# Patient Record
Sex: FEMALE | Race: WHITE | NOT HISPANIC OR LATINO | Employment: STUDENT | URBAN - METROPOLITAN AREA
[De-identification: names, ages, dates, MRNs, and addresses within clinical notes are randomized per-mention and may not be internally consistent; named-entity substitution may affect disease eponyms.]

---

## 2020-07-02 RX ORDER — DROSPIRENONE AND ETHINYL ESTRADIOL 0.02-3(28)
1 KIT ORAL DAILY
COMMUNITY
End: 2022-07-19 | Stop reason: SDUPTHER

## 2020-07-14 ENCOUNTER — OFFICE VISIT (OUTPATIENT)
Dept: OBSTETRICS AND GYNECOLOGY | Facility: CLINIC | Age: 20
End: 2020-07-14
Payer: COMMERCIAL

## 2020-07-14 VITALS
HEART RATE: 82 BPM | BODY MASS INDEX: 21.97 KG/M2 | HEIGHT: 67 IN | SYSTOLIC BLOOD PRESSURE: 104 MMHG | WEIGHT: 140 LBS | DIASTOLIC BLOOD PRESSURE: 71 MMHG

## 2020-07-14 DIAGNOSIS — Z11.3 SCREENING FOR STDS (SEXUALLY TRANSMITTED DISEASES): ICD-10-CM

## 2020-07-14 DIAGNOSIS — Z71.85 HPV VACCINE COUNSELING: ICD-10-CM

## 2020-07-14 DIAGNOSIS — Z01.419 ENCOUNTER FOR WELL WOMAN EXAM WITH ROUTINE GYNECOLOGICAL EXAM: Primary | ICD-10-CM

## 2020-07-14 PROCEDURE — 99395 PREV VISIT EST AGE 18-39: CPT | Mod: S$GLB,,, | Performed by: OBSTETRICS & GYNECOLOGY

## 2020-07-14 PROCEDURE — 99395 PR PREVENTIVE VISIT,EST,18-39: ICD-10-PCS | Mod: S$GLB,,, | Performed by: OBSTETRICS & GYNECOLOGY

## 2020-07-14 NOTE — PROGRESS NOTES
CC: Well Woman (age 21-25)    HPI:  Patient is a 20 y.o. year old G0  who presents for her well woman exam today.  History reviewed with patient and changes noted. Patient is without complaints or concerns today.        Past Medical History:   Diagnosis Date    Acne     Fibrocystic breast        History reviewed. No pertinent surgical history.    Social History     Tobacco Use    Smoking status: Never Smoker    Smokeless tobacco: Never Used   Substance Use Topics    Alcohol use: Never     Frequency: Never    Drug use: Never       Family History   Problem Relation Age of Onset    Breast cancer Maternal Grandmother         MGGM    Breast cancer Paternal Grandmother         PGGM    Breast cancer Maternal Aunt        Review of patient's allergies indicates:  No Known Allergies      Current Outpatient Medications:     drospirenone-ethinyl estradioL (NAYA) 3-0.02 mg per tablet, Take 1 tablet by mouth once daily., Disp: , Rfl:     hpv vaccine,9-fariba (GARDASIL 9, PF,) 0.5 mL Syrg, Inject 0.5 mLs into the muscle once. Repeat dose in 2 months and then again 4 months later to complete series for 1 dose, Disp: 0.5 mL, Rfl: 2    OB History        0    Para   0    Term   0       0    AB   0    Living   0       SAB   0    TAB   0    Ectopic   0    Multiple   0    Live Births   0                  GYN HX:    HX ABNL PAPS:  no    HX OF STDS:  none    GARDASIL: no but wants rx     CONTRACEPTION: ocps    HEALTH MAINTENANCE:  (PCP-Rafaela Chacon MD)    LAST ANNUAL / PAP:   2019  PAPresult: neg    LAST CXS:  2019  none     ROS:  Review of Systems   Constitutional: Negative for activity change, appetite change, chills, fatigue, fever and unexpected weight change.   Respiratory: Negative for cough and shortness of breath.    Cardiovascular: Negative for chest pain and leg swelling.   Gastrointestinal: Negative for abdominal pain, bloating, blood in stool, constipation, diarrhea, nausea and  "vomiting.   Endocrine: Negative for hair loss and hot flashes.   Genitourinary: Negative for decreased libido, dysmenorrhea, dyspareunia, dysuria, flank pain, frequency, genital sores, hematuria, menorrhagia, pelvic pain, urgency, vaginal bleeding, vaginal discharge, vaginal pain, urinary incontinence, postcoital bleeding and vaginal odor.   Musculoskeletal: Negative for arthralgias and joint swelling.   Integumentary:  Negative for rash, acne, mole/lesion, breast mass, nipple discharge, breast skin changes and breast tenderness.   Neurological: Negative for headaches.   Hematological: Does not bruise/bleed easily.   Psychiatric/Behavioral: Negative for depression and sleep disturbance. The patient is not nervous/anxious.    Breast: Negative for asymmetry, lump, mass, nipple discharge, skin changes and tenderness      VITALS:  Patient's last menstrual period was 06/18/2020 (exact date).  Vitals:    07/14/20 1123   BP: 104/71   Pulse: 82   Weight: 63.5 kg (140 lb)   Height: 5' 7" (1.702 m)     Body mass index is 21.93 kg/m².     PHYSICAL EXAM:  Physical Exam:   Constitutional: She is oriented to person, place, and time. She appears well-developed and well-nourished. No distress.    HENT:   Head: Normocephalic and atraumatic.     Neck: No thyroid mass present.    Cardiovascular: Normal rate and normal pulses.     Pulmonary/Chest: Effort normal. No respiratory distress. She exhibits no deformity. Right breast exhibits no inverted nipple, no mass, no nipple discharge, no skin change, no tenderness, no bleeding and no swelling. Left breast exhibits no inverted nipple, no mass, no nipple discharge, no skin change, no tenderness, no bleeding and no swelling. Breasts are symmetrical.        Abdominal: Soft. Normal appearance. She exhibits no distension. There is no abdominal tenderness.     Genitourinary:    Vagina and uterus normal.      Pelvic exam was performed with patient supine.   There is no rash, tenderness, lesion " or injury on the right labia. There is no rash, tenderness, lesion or injury on the left labia. Cervix is normal. Right adnexum displays no mass, no tenderness and no fullness. Left adnexum displays no mass, no tenderness and no fullness. No rectocele, cystocele or unspecified prolapse of vaginal walls in the vagina. Labial bartholins normal.negative for vaginal discharge          Musculoskeletal: Moves all extremeties.       Neurological: She is alert and oriented to person, place, and time.    Skin: Skin is warm and dry. No lesion and no rash noted.    Psychiatric: She has a normal mood and affect. Her speech is normal and behavior is normal. Judgment and thought content normal.     *female chaperone present for exam    ASSESSMENT and PLAN:  Encounter for well woman exam with routine gynecological exam  -     Liquid-based pap smear, screening    Screening for STDs (sexually transmitted diseases)    HPV vaccine counseling  -     hpv vaccine,9-fariba (GARDASIL 9, PF,) 0.5 mL Syrg; Inject 0.5 mLs into the muscle once. Repeat dose in 2 months and then again 4 months later to complete series for 1 dose  Dispense: 0.5 mL; Refill: 2         FOLLOWUP:  Follow up in about 1 year (around 7/14/2021) for wwe.     COUNSELING:  Patient was counseled today on A.C.S. Pap guidelines and recommendations for yearly pelvic exam, monthly self breast exams, contraception.  Encouraged patient to see her PCP for other health maintenance.

## 2020-07-22 ENCOUNTER — TELEPHONE (OUTPATIENT)
Dept: OBSTETRICS AND GYNECOLOGY | Facility: CLINIC | Age: 20
End: 2020-07-22

## 2020-07-22 NOTE — TELEPHONE ENCOUNTER
----- Message from Janae Muir sent at 7/22/2020  3:26 PM CDT -----  .Type:  Test Results    Who Called:  Patient   Name of Test (Lab/Mammo/Etc):    Date of Test:   Ordering Provider: dr. Andrade   Where the test was performed:  [facility]   Would the patient rather a call back or a response via [facility]?  Call   Best Call Back Number:  [phone]  Additional Information:  n/a

## 2020-07-22 NOTE — TELEPHONE ENCOUNTER
Spoke with patient. Two pt identifiers confirmed. Notified patient of results of normal pap, but cultures still pending.. Patient verbalized understanding.

## 2020-07-27 LAB
CHLAMYDIA: NOT DETECTED
GONORRHEA: NOT DETECTED

## 2020-07-28 ENCOUNTER — TELEPHONE (OUTPATIENT)
Dept: OBSTETRICS AND GYNECOLOGY | Facility: CLINIC | Age: 20
End: 2020-07-28

## 2021-07-01 ENCOUNTER — PATIENT MESSAGE (OUTPATIENT)
Dept: ADMINISTRATIVE | Facility: OTHER | Age: 21
End: 2021-07-01

## 2021-07-15 ENCOUNTER — OFFICE VISIT (OUTPATIENT)
Dept: OBSTETRICS AND GYNECOLOGY | Facility: CLINIC | Age: 21
End: 2021-07-15
Payer: COMMERCIAL

## 2021-07-15 VITALS
HEART RATE: 68 BPM | WEIGHT: 153 LBS | SYSTOLIC BLOOD PRESSURE: 123 MMHG | HEIGHT: 67 IN | DIASTOLIC BLOOD PRESSURE: 81 MMHG | BODY MASS INDEX: 24.01 KG/M2

## 2021-07-15 DIAGNOSIS — Z01.419 ENCOUNTER FOR WELL WOMAN EXAM WITH ROUTINE GYNECOLOGICAL EXAM: Primary | ICD-10-CM

## 2021-07-15 DIAGNOSIS — Z11.3 SCREENING FOR STDS (SEXUALLY TRANSMITTED DISEASES): ICD-10-CM

## 2021-07-15 PROCEDURE — 3008F BODY MASS INDEX DOCD: CPT | Mod: CPTII,S$GLB,, | Performed by: OBSTETRICS & GYNECOLOGY

## 2021-07-15 PROCEDURE — 99395 PR PREVENTIVE VISIT,EST,18-39: ICD-10-PCS | Mod: S$GLB,,, | Performed by: OBSTETRICS & GYNECOLOGY

## 2021-07-15 PROCEDURE — 1126F PR PAIN SEVERITY QUANTIFIED, NO PAIN PRESENT: ICD-10-PCS | Mod: S$GLB,,, | Performed by: OBSTETRICS & GYNECOLOGY

## 2021-07-15 PROCEDURE — 1126F AMNT PAIN NOTED NONE PRSNT: CPT | Mod: S$GLB,,, | Performed by: OBSTETRICS & GYNECOLOGY

## 2021-07-15 PROCEDURE — 99395 PREV VISIT EST AGE 18-39: CPT | Mod: S$GLB,,, | Performed by: OBSTETRICS & GYNECOLOGY

## 2021-07-15 PROCEDURE — 3008F PR BODY MASS INDEX (BMI) DOCUMENTED: ICD-10-PCS | Mod: CPTII,S$GLB,, | Performed by: OBSTETRICS & GYNECOLOGY

## 2021-07-20 LAB
CHLAMYDIA: NEGATIVE
GONORRHEA: NEGATIVE
SOURCE: NORMAL
SOURCE: NORMAL
TRICHOMONAS AMPLIFIED: NEGATIVE

## 2021-09-07 ENCOUNTER — TELEPHONE (OUTPATIENT)
Dept: OBSTETRICS AND GYNECOLOGY | Facility: CLINIC | Age: 21
End: 2021-09-07

## 2022-02-21 ENCOUNTER — TELEPHONE (OUTPATIENT)
Dept: OBSTETRICS AND GYNECOLOGY | Facility: CLINIC | Age: 22
End: 2022-02-21
Payer: COMMERCIAL

## 2022-02-22 ENCOUNTER — TELEPHONE (OUTPATIENT)
Dept: OBSTETRICS AND GYNECOLOGY | Facility: CLINIC | Age: 22
End: 2022-02-22
Payer: COMMERCIAL

## 2022-02-22 NOTE — TELEPHONE ENCOUNTER
Attempted to contact patient, no answer. Left patient voice mail with appointment date and time, 2/24/22 at 9:45am.

## 2022-02-23 RX ORDER — DEXTROAMPHETAMINE SACCHARATE, AMPHETAMINE ASPARTATE, DEXTROAMPHETAMINE SULFATE AND AMPHETAMINE SULFATE 5; 5; 5; 5 MG/1; MG/1; MG/1; MG/1
TABLET ORAL
COMMUNITY
Start: 2022-02-11

## 2022-02-23 RX ORDER — DEXTROAMPHETAMINE SACCHARATE, AMPHETAMINE ASPARTATE MONOHYDRATE, DEXTROAMPHETAMINE SULFATE AND AMPHETAMINE SULFATE 7.5; 7.5; 7.5; 7.5 MG/1; MG/1; MG/1; MG/1
CAPSULE, EXTENDED RELEASE ORAL
COMMUNITY
Start: 2022-02-11

## 2022-02-24 ENCOUNTER — OFFICE VISIT (OUTPATIENT)
Dept: OBSTETRICS AND GYNECOLOGY | Facility: CLINIC | Age: 22
End: 2022-02-24
Payer: COMMERCIAL

## 2022-02-24 VITALS
HEART RATE: 78 BPM | BODY MASS INDEX: 24.11 KG/M2 | WEIGHT: 153.63 LBS | SYSTOLIC BLOOD PRESSURE: 112 MMHG | HEIGHT: 67 IN | DIASTOLIC BLOOD PRESSURE: 72 MMHG

## 2022-02-24 DIAGNOSIS — N76.0 BV (BACTERIAL VAGINOSIS): ICD-10-CM

## 2022-02-24 DIAGNOSIS — N89.8 VAGINAL DISCHARGE: ICD-10-CM

## 2022-02-24 DIAGNOSIS — Z11.3 SCREENING FOR STDS (SEXUALLY TRANSMITTED DISEASES): Primary | ICD-10-CM

## 2022-02-24 DIAGNOSIS — B96.89 BV (BACTERIAL VAGINOSIS): ICD-10-CM

## 2022-02-24 PROCEDURE — 1160F PR REVIEW ALL MEDS BY PRESCRIBER/CLIN PHARMACIST DOCUMENTED: ICD-10-PCS | Mod: CPTII,S$GLB,, | Performed by: OBSTETRICS & GYNECOLOGY

## 2022-02-24 PROCEDURE — 3074F SYST BP LT 130 MM HG: CPT | Mod: CPTII,S$GLB,, | Performed by: OBSTETRICS & GYNECOLOGY

## 2022-02-24 PROCEDURE — 3078F DIAST BP <80 MM HG: CPT | Mod: CPTII,S$GLB,, | Performed by: OBSTETRICS & GYNECOLOGY

## 2022-02-24 PROCEDURE — 3008F PR BODY MASS INDEX (BMI) DOCUMENTED: ICD-10-PCS | Mod: CPTII,S$GLB,, | Performed by: OBSTETRICS & GYNECOLOGY

## 2022-02-24 PROCEDURE — 3074F PR MOST RECENT SYSTOLIC BLOOD PRESSURE < 130 MM HG: ICD-10-PCS | Mod: CPTII,S$GLB,, | Performed by: OBSTETRICS & GYNECOLOGY

## 2022-02-24 PROCEDURE — 1159F PR MEDICATION LIST DOCUMENTED IN MEDICAL RECORD: ICD-10-PCS | Mod: CPTII,S$GLB,, | Performed by: OBSTETRICS & GYNECOLOGY

## 2022-02-24 PROCEDURE — 99213 OFFICE O/P EST LOW 20 MIN: CPT | Mod: S$GLB,,, | Performed by: OBSTETRICS & GYNECOLOGY

## 2022-02-24 PROCEDURE — 1160F RVW MEDS BY RX/DR IN RCRD: CPT | Mod: CPTII,S$GLB,, | Performed by: OBSTETRICS & GYNECOLOGY

## 2022-02-24 PROCEDURE — 1159F MED LIST DOCD IN RCRD: CPT | Mod: CPTII,S$GLB,, | Performed by: OBSTETRICS & GYNECOLOGY

## 2022-02-24 PROCEDURE — 99213 PR OFFICE/OUTPT VISIT, EST, LEVL III, 20-29 MIN: ICD-10-PCS | Mod: S$GLB,,, | Performed by: OBSTETRICS & GYNECOLOGY

## 2022-02-24 PROCEDURE — 3008F BODY MASS INDEX DOCD: CPT | Mod: CPTII,S$GLB,, | Performed by: OBSTETRICS & GYNECOLOGY

## 2022-02-24 PROCEDURE — 3078F PR MOST RECENT DIASTOLIC BLOOD PRESSURE < 80 MM HG: ICD-10-PCS | Mod: CPTII,S$GLB,, | Performed by: OBSTETRICS & GYNECOLOGY

## 2022-02-24 RX ORDER — CLINDAMYCIN PHOSPHATE 20 MG/G
CREAM VAGINAL NIGHTLY
Qty: 40 G | Refills: 0 | Status: SHIPPED | OUTPATIENT
Start: 2022-02-24 | End: 2022-07-18

## 2022-02-24 NOTE — PROGRESS NOTES
PROBLEM VISIT (established pt ):  Patient Care Team:  Alok Chacon Jr., MD as PCP - General (Family Medicine)  Pardeep Cabrera MD (Dermatology)    The patient's last visit with me was on 7/15/2021 for wwe      HPI:  Pt is a 21 y.o. G0 who wants to be checked for any stds as she has had a heavy vaginal dc x 1 week- yellowish white- not really irritated that she can tell. No pain. No precipitating factors she is aware of- no recent illness, abx, steroids, new partner, etc    LMP/ CONTRACEPTION:  ocps   Patient's last menstrual period was 02/12/2022 (approximate).      Past Medical History:   Diagnosis Date    Acne     Fibrocystic breast        SURGICAL HX:   has no past surgical history on file.    Family, obstetric, and social history reviewed and updated    ALLERGIES: Patient has no known allergies.    Current Outpatient Medications:     dextroamphetamine-amphetamine (ADDERALL XR) 30 MG 24 hr capsule, TAKE 1 CAPSULE BY MOUTH EVERY MORNING F90.1, Disp: , Rfl:     dextroamphetamine-amphetamine (ADDERALL) 20 mg tablet, TAKE 1 TABLET BY MOUTH ONCE A DAY IN THE MID AFTERNOON. F90.2, Disp: , Rfl:     drospirenone-ethinyl estradioL (NAYA) 3-0.02 mg per tablet, Take 1 tablet by mouth once daily., Disp: , Rfl:     clindamycin (CLINDESSE) 2 % vaginal cream, Place vaginally every evening., Disp: 40 g, Rfl: 0    ROS:  CONST:  No fever, chills, fatigue or unexpected changes in weight  CV: No chest pain or palpitations  RESP:  No shortness of breath or cough  GI: No abd pain, vomiting, diarrhea, blood in stool, or changes in bowel mvmts  SKIN: No rashes or lesions  MUSCULOSKELETAL: No joint swelling or pain  PSYCH: No changes in mood or insomia  BREASTS: No asymmetry, lumps, pain, nipple discharge, or skin changes  :  No dysuria, urgency, frequency, hematuria or incontinence          No itching,+yellow d/c          No pelvic pain, dyspareunia, or abnormal vaginal bleeding    VITALS:  Blood pressure 112/72, pulse 78,  "height 5' 7" (1.702 m), weight 69.7 kg (153 lb 9.6 oz), last menstrual period 02/12/2022.  Body mass index is 24.06 kg/m².    PHYSICAL EXAM-  APPEARANCE: Well appearing, in no acute distress.  NECK: Neck symmetric without masses or thyromegaly.  CV:  Normal rate  PULM: Normal resp rate, no resp distress, normal resp effort  PSYCH: Normal mood and affect, cooperative  SKIN: No rashes, lesions, or abnormal bruising  LYMPH: No inguinal adenopathy  ABD: Soft, without tenderness or masses. No palpable hernias or hsm  PELVIC:  VULVA: No lesions. Normal female genitalia.  URETHRAL MEATUS: No masses, no prolapse.  BLADDER/ URETHRA: No masses or suprapubic tenderness  VAGINA: No  erythema, or lesions. +BV like d/c  CVX: No lesions,no cervical motion tenderness.  UTERUS: Normal size/shape, mobile, non-tender  ADNEXA: No masses, tenderness, or fullness.  ANUS/ PERINEUM: Normal tone.  No lesions.     *Female chaperone present for entire exam    ASSESSMENT/ PLAN:  Screening for STDs (sexually transmitted diseases)    Vaginal discharge  -     Genital Culture    BV (bacterial vaginosis)  -     clindamycin (CLINDESSE) 2 % vaginal cream; Place vaginally every evening.  Dispense: 40 g; Refill: 0      FOLLOWUP:  WWE or sooner prn      "

## 2022-03-16 ENCOUNTER — TELEPHONE (OUTPATIENT)
Dept: OBSTETRICS AND GYNECOLOGY | Facility: CLINIC | Age: 22
End: 2022-03-16
Payer: COMMERCIAL

## 2022-03-16 DIAGNOSIS — N76.0 ACUTE VAGINITIS: Primary | ICD-10-CM

## 2022-03-16 RX ORDER — LEVOFLOXACIN 500 MG/1
500 TABLET, FILM COATED ORAL DAILY
Qty: 5 TABLET | Refills: 0 | Status: SHIPPED | OUTPATIENT
Start: 2022-03-16 | End: 2022-03-21

## 2022-03-16 NOTE — PROGRESS NOTES
Please let pt know her vag oneswab cx came back only positive for ecoli which can sometimes make its way to the vagina from the GI tract.  I sent out a rx to take of that but avoid baths

## 2022-03-16 NOTE — TELEPHONE ENCOUNTER
Spoke with patient. Two pt identifiers confirmed. Notified patient of results of cx pos.for e coli. abx rx sent to her pharmacy and she should avoid tub baths. Patient verbalized understanding.

## 2022-03-16 NOTE — TELEPHONE ENCOUNTER
----- Message from Aminata Andrade MD sent at 3/16/2022 11:44 AM CDT -----  Please let pt know her vag oneswab cx came back only positive for ecoli which can sometimes make its way to the vagina from the GI tract.  I sent out a rx to take of that but avoid baths

## 2022-07-18 NOTE — PROGRESS NOTES
CC: WELL WOMAN (age 21-25)   Patient Care Team:  Alok Chacon Jr., MD as PCP - General (Family Medicine)  Pardeep Cabrera MD (Dermatology)    The patient's last visit with me was on 2022 for problem visit- had bv    HPI:  Patient is a 22 y.o. who presents for her well woman exam today.  History reviewed with patient.   Patient is without complaints or concerns today.     Patient's last menstrual period was 2022 (exact date).       CONTRACEPTION: ocps  GARDASIL: no- declines  HX ABNL PAPS: none    REVIEW OF DATA/ HEALTH MAINTENANCE  LAST ANNUAL/ PAP/ CXS:   July 15 2021       Past Medical History:   Diagnosis Date    Acne     BV (bacterial vaginosis)     Fibrocystic breast      SURGICAL HX:    has no past surgical history on file.    SOCIAL HX:    reports that she has never smoked. She has never used smokeless tobacco. She reports previous alcohol use. She reports that she does not use drugs.    FAMILY HX:   family history includes Breast cancer in her maternal aunt, maternal grandmother, and paternal grandmother; No Known Problems in her brother, father, maternal grandfather, mother, paternal grandfather, and sister.    ALLERGIES:  Patient has no known allergies.    Current Outpatient Medications:     dextroamphetamine-amphetamine (ADDERALL XR) 30 MG 24 hr capsule, TAKE 1 CAPSULE BY MOUTH EVERY MORNING F90.1, Disp: , Rfl:     dextroamphetamine-amphetamine (ADDERALL) 20 mg tablet, TAKE 1 TABLET BY MOUTH ONCE A DAY IN THE MID AFTERNOON. F90.2, Disp: , Rfl:     drospirenone-ethinyl estradioL (NAYA) 3-0.02 mg per tablet, Take 1 tablet by mouth once daily., Disp: 84 tablet, Rfl: 4    ROS:  CONST:  No fever, chills, fatigue or unexpected changes in weight  CV: No chest pain or palpitations  RESP:  No shortness of breath or cough  GI: No abd pain, vomiting, diarrhea, blood in stool, or changes in bowel mvmts  SKIN: No rashes or lesions  MUSCULOSKELETAL: No joint swelling or pain  PSYCH: No  "changes in mood or insomia  BREASTS: No asymmetry, lumps, pain, nipple discharge, or skin changes  :  No dysuria, urgency, frequency, hematuria or incontinence          No vag dc, itching, odor or dryness          No pelvic pain, dyspareunia, or abnormal vaginal bleeding    VITALS:  Blood pressure 122/82, pulse 71, height 5' 7" (1.702 m), weight 72.4 kg (159 lb 9.6 oz), last menstrual period 07/12/2022.  Body mass index is 25 kg/m².     PHYSICAL EXAM-  APPEARANCE: Well appearing, in no acute distress.  NECK: Neck symmetric without masses or thyromegaly.  CV:  Normal rate  PULM: Normal resp rate, no resp distress, normal resp effort  PSYCH: Normal mood and affect, cooperative  SKIN: No rashes, lesions, or abnormal bruising  LYMPH: No inguinal or axillary adenopathy  ABD: Soft, without tenderness or masses. No palpable hernias or hsm  BREASTS: Symmetrical, no skin changes or lesions. No palpable masses, tenderness, or nipple dc  PELVIC:  VULVA: No lesions. Normal female genitalia.  URETHRAL MEATUS: No masses, no prolapse.  BLADDER/ URETHRA: No masses or suprapubic tenderness  VAGINA: No discharge, erythema, or lesions. No significant cystocele or rectocele.   CVX: No lesions,no cervical motion tenderness   UTERUS: Normal size/shape, mobile, non-tender  ADNEXA: No masses, tenderness, or fullness.  ANUS/ PERINEUM: Normal tone.  No lesions.  *female chaperone present for entire exam    ASSESSMENT and PLAN:  Encounter for well woman exam with routine gynecological exam  -     Liquid-based pap smear, screening    Screening for STDs (sexually transmitted diseases)  -     C. trachomatis/N. gonorrhoeae by AMP DNA Other; Cervicovaginal  -     Trichomonas Vaginalis, BETO    Encounter for contraceptive management, unspecified type  -     drospirenone-ethinyl estradioL (NAYA) 3-0.02 mg per tablet; Take 1 tablet by mouth once daily.  Dispense: 84 tablet; Refill: 4       FOLLOWUP:  1 year for wwe or sooner " prn    COUNSELING:  Patient was counseled today on recommendation for yearly pelvic exam/STD screening, current Pap guidelines, self breast exams, and contraception. Encouraged patient to see her PCP for other health maintenance.

## 2022-07-19 ENCOUNTER — OFFICE VISIT (OUTPATIENT)
Dept: OBSTETRICS AND GYNECOLOGY | Facility: CLINIC | Age: 22
End: 2022-07-19
Payer: COMMERCIAL

## 2022-07-19 VITALS
SYSTOLIC BLOOD PRESSURE: 122 MMHG | HEIGHT: 67 IN | WEIGHT: 159.63 LBS | DIASTOLIC BLOOD PRESSURE: 82 MMHG | BODY MASS INDEX: 25.06 KG/M2 | HEART RATE: 71 BPM

## 2022-07-19 DIAGNOSIS — Z11.3 SCREENING FOR STDS (SEXUALLY TRANSMITTED DISEASES): ICD-10-CM

## 2022-07-19 DIAGNOSIS — Z01.419 ENCOUNTER FOR WELL WOMAN EXAM WITH ROUTINE GYNECOLOGICAL EXAM: Primary | ICD-10-CM

## 2022-07-19 DIAGNOSIS — Z30.9 ENCOUNTER FOR CONTRACEPTIVE MANAGEMENT, UNSPECIFIED TYPE: ICD-10-CM

## 2022-07-19 PROCEDURE — 1159F PR MEDICATION LIST DOCUMENTED IN MEDICAL RECORD: ICD-10-PCS | Mod: CPTII,S$GLB,, | Performed by: OBSTETRICS & GYNECOLOGY

## 2022-07-19 PROCEDURE — 1159F MED LIST DOCD IN RCRD: CPT | Mod: CPTII,S$GLB,, | Performed by: OBSTETRICS & GYNECOLOGY

## 2022-07-19 PROCEDURE — 3008F PR BODY MASS INDEX (BMI) DOCUMENTED: ICD-10-PCS | Mod: CPTII,S$GLB,, | Performed by: OBSTETRICS & GYNECOLOGY

## 2022-07-19 PROCEDURE — 3079F DIAST BP 80-89 MM HG: CPT | Mod: CPTII,S$GLB,, | Performed by: OBSTETRICS & GYNECOLOGY

## 2022-07-19 PROCEDURE — 3079F PR MOST RECENT DIASTOLIC BLOOD PRESSURE 80-89 MM HG: ICD-10-PCS | Mod: CPTII,S$GLB,, | Performed by: OBSTETRICS & GYNECOLOGY

## 2022-07-19 PROCEDURE — 99395 PREV VISIT EST AGE 18-39: CPT | Mod: S$GLB,,, | Performed by: OBSTETRICS & GYNECOLOGY

## 2022-07-19 PROCEDURE — 99395 PR PREVENTIVE VISIT,EST,18-39: ICD-10-PCS | Mod: S$GLB,,, | Performed by: OBSTETRICS & GYNECOLOGY

## 2022-07-19 PROCEDURE — 3074F SYST BP LT 130 MM HG: CPT | Mod: CPTII,S$GLB,, | Performed by: OBSTETRICS & GYNECOLOGY

## 2022-07-19 PROCEDURE — 3008F BODY MASS INDEX DOCD: CPT | Mod: CPTII,S$GLB,, | Performed by: OBSTETRICS & GYNECOLOGY

## 2022-07-19 PROCEDURE — 3074F PR MOST RECENT SYSTOLIC BLOOD PRESSURE < 130 MM HG: ICD-10-PCS | Mod: CPTII,S$GLB,, | Performed by: OBSTETRICS & GYNECOLOGY

## 2022-07-19 RX ORDER — DROSPIRENONE AND ETHINYL ESTRADIOL 0.02-3(28)
1 KIT ORAL DAILY
Qty: 84 TABLET | Refills: 4 | Status: SHIPPED | OUTPATIENT
Start: 2022-07-19

## 2022-07-22 ENCOUNTER — TELEPHONE (OUTPATIENT)
Dept: OBSTETRICS AND GYNECOLOGY | Facility: CLINIC | Age: 22
End: 2022-07-22
Payer: COMMERCIAL

## 2022-10-10 DIAGNOSIS — N30.00 ACUTE CYSTITIS WITHOUT HEMATURIA: Primary | ICD-10-CM

## 2022-10-10 NOTE — PROGRESS NOTES
PROBLEM VISIT - premenopausal   Patient Care Team:  Alok Chacon Jr., MD as PCP - General (Family Medicine)  Pardeep Cabrera MD (Dermatology)    CC:  vag dc itching and odor  HPI:  Pt is a 22 y.o.  who c/o vag dc and itching and odor x3 days. Feels like yeast but also feels like its watery. No recent abx, steroids, illness, new medications, excessive stress, or other changes that correlate with onset of symptoms    The patient's last visit with me was on 2022 for wwe    REVIEW OF PRIOR DATA/ HEALTH MAINTENANCE:  LAST ANNUAL:   2022         CONTRACEPTION: ocps    HX ABNL PAPS: none  GARDASIL:  no- declines    Past Medical History:   Diagnosis Date    Acne     BV (bacterial vaginosis)     Fibrocystic breast      SURGICAL HX:   has a past surgical history that includes Tonsillectomy.    Family, obstetric, and social history reviewed and updated    ALLERGIES: Patient has no known allergies.    Current Outpatient Medications:     dextroamphetamine-amphetamine (ADDERALL XR) 30 MG 24 hr capsule, TAKE 1 CAPSULE BY MOUTH EVERY MORNING F90.1, Disp: , Rfl:     dextroamphetamine-amphetamine (ADDERALL) 20 mg tablet, TAKE 1 TABLET BY MOUTH ONCE A DAY IN THE MID AFTERNOON. F90.2, Disp: , Rfl:     drospirenone-ethinyl estradioL (NAYA) 3-0.02 mg per tablet, Take 1 tablet by mouth once daily., Disp: 84 tablet, Rfl: 4    ROS:  CONST:  No fever, chills, fatigue or unexpected changes in weight  CV: No chest pain or palpitations  RESP:  No shortness of breath or cough  GI: No abd pain, vomiting, diarrhea, blood in stool, or changes in bowel mvmts  SKIN: No rashes or lesions  MUSCULOSKELETAL: No joint swelling or pain  PSYCH: No changes in mood or insomia  BREASTS: No asymmetry, lumps, pain, nipple discharge, or skin changes  :  No dysuria, urgency, frequency, hematuria or incontinence          +dc +itching and irritation, +odor          No pelvic pain, dyspareunia, or abnormal vaginal bleeding    VITALS:  Blood  "pressure 128/77, pulse 105, height 5' 7" (1.702 m), weight 72.1 kg (159 lb), last menstrual period 10/01/2022.  Body mass index is 24.9 kg/m².    PHYSICAL EXAM-  APPEARANCE: Well appearing, in no acute distress.  NECK: Neck symmetric without masses or thyromegaly.  CV:  Normal rate  PULM: Normal resp rate, no resp distress, normal resp effort  PSYCH: Normal mood and affect, cooperative  SKIN: No rashes, lesions, or abnormal bruising  LYMPH: No inguinal adenopathy  ABD: Soft, without tenderness or masses. No palpable hernias or hsm  BREAST:  deferred/declined  PELVIC:  VULVA: Normal female genitalia.No lesions.   URETHRAL MEATUS: No masses, no prolapse.  BLADDER/ URETHRA: No masses or suprapubic tenderness  VAGINA: No lesions + yeast and bv like dc +erythema  CVX: No lesions,no cervical motion tenderness.   UTERUS: Normal size/shape, mobile, non-tender.   ADNEXA: No masses, tenderness, or fullness.  ANUS/ PERINEUM: Normal tone.  No lesions.     *female chaperone present for entire exam    ASSESSMENT/ PLAN:  BV (bacterial vaginosis)  -     metroNIDAZOLE (FLAGYL) 500 MG tablet; Take 1 tablet (500 mg total) by mouth 2 (two) times a day. for 7 days  Dispense: 14 tablet; Refill: 0    Yeast vaginitis  -     fluconazole (DIFLUCAN) 150 MG Tab; Take 1 tablet (150 mg total) by mouth every other day. for 3 doses  Dispense: 3 tablet; Refill: 0    Vaginal discharge  -     Genital Culture    FOLLOWUP:  WWE or sooner prn        "

## 2022-10-11 ENCOUNTER — OFFICE VISIT (OUTPATIENT)
Dept: OBSTETRICS AND GYNECOLOGY | Facility: CLINIC | Age: 22
End: 2022-10-11
Payer: COMMERCIAL

## 2022-10-11 VITALS
BODY MASS INDEX: 24.96 KG/M2 | DIASTOLIC BLOOD PRESSURE: 77 MMHG | WEIGHT: 159 LBS | HEART RATE: 105 BPM | SYSTOLIC BLOOD PRESSURE: 128 MMHG | HEIGHT: 67 IN

## 2022-10-11 DIAGNOSIS — N76.0 BV (BACTERIAL VAGINOSIS): Primary | ICD-10-CM

## 2022-10-11 DIAGNOSIS — B37.31 YEAST VAGINITIS: ICD-10-CM

## 2022-10-11 DIAGNOSIS — N89.8 VAGINAL DISCHARGE: ICD-10-CM

## 2022-10-11 DIAGNOSIS — B96.89 BV (BACTERIAL VAGINOSIS): Primary | ICD-10-CM

## 2022-10-11 PROCEDURE — 3074F PR MOST RECENT SYSTOLIC BLOOD PRESSURE < 130 MM HG: ICD-10-PCS | Mod: CPTII,S$GLB,, | Performed by: OBSTETRICS & GYNECOLOGY

## 2022-10-11 PROCEDURE — 3078F DIAST BP <80 MM HG: CPT | Mod: CPTII,S$GLB,, | Performed by: OBSTETRICS & GYNECOLOGY

## 2022-10-11 PROCEDURE — 99213 PR OFFICE/OUTPT VISIT, EST, LEVL III, 20-29 MIN: ICD-10-PCS | Mod: S$GLB,,, | Performed by: OBSTETRICS & GYNECOLOGY

## 2022-10-11 PROCEDURE — 3078F PR MOST RECENT DIASTOLIC BLOOD PRESSURE < 80 MM HG: ICD-10-PCS | Mod: CPTII,S$GLB,, | Performed by: OBSTETRICS & GYNECOLOGY

## 2022-10-11 PROCEDURE — 3008F PR BODY MASS INDEX (BMI) DOCUMENTED: ICD-10-PCS | Mod: CPTII,S$GLB,, | Performed by: OBSTETRICS & GYNECOLOGY

## 2022-10-11 PROCEDURE — 3008F BODY MASS INDEX DOCD: CPT | Mod: CPTII,S$GLB,, | Performed by: OBSTETRICS & GYNECOLOGY

## 2022-10-11 PROCEDURE — 99213 OFFICE O/P EST LOW 20 MIN: CPT | Mod: S$GLB,,, | Performed by: OBSTETRICS & GYNECOLOGY

## 2022-10-11 PROCEDURE — 1160F RVW MEDS BY RX/DR IN RCRD: CPT | Mod: CPTII,S$GLB,, | Performed by: OBSTETRICS & GYNECOLOGY

## 2022-10-11 PROCEDURE — 1160F PR REVIEW ALL MEDS BY PRESCRIBER/CLIN PHARMACIST DOCUMENTED: ICD-10-PCS | Mod: CPTII,S$GLB,, | Performed by: OBSTETRICS & GYNECOLOGY

## 2022-10-11 PROCEDURE — 1159F PR MEDICATION LIST DOCUMENTED IN MEDICAL RECORD: ICD-10-PCS | Mod: CPTII,S$GLB,, | Performed by: OBSTETRICS & GYNECOLOGY

## 2022-10-11 PROCEDURE — 1159F MED LIST DOCD IN RCRD: CPT | Mod: CPTII,S$GLB,, | Performed by: OBSTETRICS & GYNECOLOGY

## 2022-10-11 PROCEDURE — 3074F SYST BP LT 130 MM HG: CPT | Mod: CPTII,S$GLB,, | Performed by: OBSTETRICS & GYNECOLOGY

## 2022-10-11 RX ORDER — FLUCONAZOLE 150 MG/1
150 TABLET ORAL EVERY OTHER DAY
Qty: 3 TABLET | Refills: 0 | Status: SHIPPED | OUTPATIENT
Start: 2022-10-11 | End: 2022-10-16

## 2022-10-11 RX ORDER — METRONIDAZOLE 500 MG/1
500 TABLET ORAL 2 TIMES DAILY
Qty: 14 TABLET | Refills: 0 | Status: SHIPPED | OUTPATIENT
Start: 2022-10-11 | End: 2022-10-18

## 2022-10-17 DIAGNOSIS — N76.0 ACUTE VAGINITIS: Primary | ICD-10-CM

## 2022-10-17 RX ORDER — AMOXICILLIN 500 MG/1
500 TABLET, FILM COATED ORAL EVERY 12 HOURS
Qty: 10 TABLET | Refills: 0 | Status: SHIPPED | OUTPATIENT
Start: 2022-10-17 | End: 2022-10-22

## 2022-10-17 NOTE — PROGRESS NOTES
Please call patient and let her know her recent culture was positive for gbbs, which are bacteria that can come from GI contamination of the vaginal area. I have sent out antibiotics to her pharmacy to take care of these bacteria. Avoid tub baths

## 2023-05-15 NOTE — PROGRESS NOTES
"PROBLEM VISIT -   Patient Care Team:  Alok Chacon Jr., MD as PCP - General (Family Medicine)  Pardeep Cabrera MD (Dermatology)    CC:  vag dc and irritation  HPI:  Pt is a 22 y.o.  who has been having some irritation and discharge x 1 week, No recent illnesses, abx, steroids, etc or other correlating factor    The patient's last visit with me was on 10/11/2022 for BV, yeast, enterococcus    REVIEW OF PRIOR DATA/ HEALTH MAINTENANCE:  LAST ANNUAL:   2022       CONTRACEPTION: ocps  GARDASIL:  no- declines    HX ABNL PAPS: none    Past Medical History:   Diagnosis Date    Acne     BV (bacterial vaginosis)     Fibrocystic breast        SURGICAL HX:   has a past surgical history that includes Tonsillectomy.    Family, obstetric, and social history reviewed and updated    ALLERGIES: Patient has no known allergies.  Current Outpatient Medications   Medication Instructions    dextroamphetamine-amphetamine (ADDERALL XR) 30 MG 24 hr capsule TAKE 1 CAPSULE BY MOUTH EVERY MORNING F90.1    dextroamphetamine-amphetamine (ADDERALL) 20 mg tablet TAKE 1 TABLET BY MOUTH ONCE A DAY IN THE MID AFTERNOON. F90.2    drospirenone-ethinyl estradioL (NAYA) 3-0.02 mg per tablet 1 tablet, Oral, Daily    fluconazole (DIFLUCAN) 150 mg, Oral, Every other day       ROS:  CONST:  No fever, chills, fatigue or unexpected changes in weight   CV: No chest pain or palpitations   RESP:  No shortness of breath or cough   GI: No abd pain, vomiting, diarrhea, blood in stool, or changes in bowel mvmts   SKIN: No rashes or lesions  MUSCULOSKELETAL: No joint swelling or pain   PSYCH: No changes in mood or insomia   BREASTS: No asymmetry, lumps, pain, nipple discharge, or skin changes   :  No dysuria, urgency, frequency, hematuria or incontinence          +vag dc, +itching          No pelvic pain, dyspareunia, or abnormal vaginal bleeding     VITALS:  Blood pressure 116/77, pulse 70, height 5' 7" (1.702 m), weight 74.4 kg (164 lb).  Body " mass index is 25.69 kg/m².    PHYSICAL EXAM-  APPEARANCE: Well appearing, in no acute distress.   NECK: Neck symmetric.   CV:  Normal rate   PULM: Normal resp rate, no resp distress, normal resp effort   PSYCH: Normal mood and affect, cooperative   SKIN: No visible rashes, lesions, or abnormal bruising   LYMPH: No inguinal adenopathy   ABD: Soft, without tenderness or masses.   BREAST:  deferred/declined   PELVIC:  VULVA: Normal female genitalia.No lesions.   URETHRAL MEATUS: No masses, no prolapse.  BLADDER/ URETHRA: No masses or suprapubic tenderness   VAGINA: No lesions +yeast dc  CVX: No lesions,no cervical motion tenderness.   UTERUS: Normal size/shape, mobile, non-tender.   ADNEXA: No masses, tenderness, or fullness.   ANUS/ PERINEUM: Normal tone.  No lesions.      *female chaperone present for entire exam    ASSESSMENT/ PLAN:  Screening for STDs (sexually transmitted diseases)    Vaginal discharge  -     Genital Culture    Yeast vaginitis  -     fluconazole (DIFLUCAN) 150 MG Tab; Take 1 tablet (150 mg total) by mouth every other day. for 3 doses  Dispense: 3 tablet; Refill: 0    FOLLOWUP:  WWE or sooner prn

## 2023-05-16 ENCOUNTER — OFFICE VISIT (OUTPATIENT)
Dept: OBSTETRICS AND GYNECOLOGY | Facility: CLINIC | Age: 23
End: 2023-05-16
Payer: COMMERCIAL

## 2023-05-16 VITALS
HEIGHT: 67 IN | WEIGHT: 164 LBS | DIASTOLIC BLOOD PRESSURE: 77 MMHG | SYSTOLIC BLOOD PRESSURE: 116 MMHG | HEART RATE: 70 BPM | BODY MASS INDEX: 25.74 KG/M2

## 2023-05-16 DIAGNOSIS — Z11.3 SCREENING FOR STDS (SEXUALLY TRANSMITTED DISEASES): Primary | ICD-10-CM

## 2023-05-16 DIAGNOSIS — B37.31 YEAST VAGINITIS: ICD-10-CM

## 2023-05-16 DIAGNOSIS — N89.8 VAGINAL DISCHARGE: ICD-10-CM

## 2023-05-16 PROCEDURE — 99213 OFFICE O/P EST LOW 20 MIN: CPT | Mod: S$GLB,,, | Performed by: OBSTETRICS & GYNECOLOGY

## 2023-05-16 PROCEDURE — 3078F DIAST BP <80 MM HG: CPT | Mod: CPTII,S$GLB,, | Performed by: OBSTETRICS & GYNECOLOGY

## 2023-05-16 PROCEDURE — 3008F PR BODY MASS INDEX (BMI) DOCUMENTED: ICD-10-PCS | Mod: CPTII,S$GLB,, | Performed by: OBSTETRICS & GYNECOLOGY

## 2023-05-16 PROCEDURE — 1159F PR MEDICATION LIST DOCUMENTED IN MEDICAL RECORD: ICD-10-PCS | Mod: CPTII,S$GLB,, | Performed by: OBSTETRICS & GYNECOLOGY

## 2023-05-16 PROCEDURE — 3078F PR MOST RECENT DIASTOLIC BLOOD PRESSURE < 80 MM HG: ICD-10-PCS | Mod: CPTII,S$GLB,, | Performed by: OBSTETRICS & GYNECOLOGY

## 2023-05-16 PROCEDURE — 3074F PR MOST RECENT SYSTOLIC BLOOD PRESSURE < 130 MM HG: ICD-10-PCS | Mod: CPTII,S$GLB,, | Performed by: OBSTETRICS & GYNECOLOGY

## 2023-05-16 PROCEDURE — 3008F BODY MASS INDEX DOCD: CPT | Mod: CPTII,S$GLB,, | Performed by: OBSTETRICS & GYNECOLOGY

## 2023-05-16 PROCEDURE — 3074F SYST BP LT 130 MM HG: CPT | Mod: CPTII,S$GLB,, | Performed by: OBSTETRICS & GYNECOLOGY

## 2023-05-16 PROCEDURE — 1159F MED LIST DOCD IN RCRD: CPT | Mod: CPTII,S$GLB,, | Performed by: OBSTETRICS & GYNECOLOGY

## 2023-05-16 PROCEDURE — 99213 PR OFFICE/OUTPT VISIT, EST, LEVL III, 20-29 MIN: ICD-10-PCS | Mod: S$GLB,,, | Performed by: OBSTETRICS & GYNECOLOGY

## 2023-05-16 RX ORDER — FLUCONAZOLE 150 MG/1
150 TABLET ORAL EVERY OTHER DAY
Qty: 3 TABLET | Refills: 0 | Status: SHIPPED | OUTPATIENT
Start: 2023-05-16 | End: 2023-05-21

## 2023-05-22 DIAGNOSIS — N76.0 ACUTE VAGINITIS: Primary | ICD-10-CM

## 2023-05-22 RX ORDER — AMOXICILLIN 500 MG/1
500 TABLET, FILM COATED ORAL EVERY 12 HOURS
Qty: 10 TABLET | Refills: 0 | Status: SHIPPED | OUTPATIENT
Start: 2023-05-22 | End: 2023-05-27

## 2023-05-22 NOTE — PROGRESS NOTES
Please let pt know that her oneswab showed just some gbbs which is a GI bacteria that can sometimes find its way into the vaginal area and cause dc and irritation. Ill send out an abx to clear it but make sure she avoids baths

## 2023-07-12 ENCOUNTER — PATIENT MESSAGE (OUTPATIENT)
Dept: OBSTETRICS AND GYNECOLOGY | Facility: CLINIC | Age: 23
End: 2023-07-12
Payer: COMMERCIAL

## 2023-09-29 ENCOUNTER — OFFICE VISIT (OUTPATIENT)
Dept: OBSTETRICS AND GYNECOLOGY | Facility: CLINIC | Age: 23
End: 2023-09-29
Payer: COMMERCIAL

## 2023-09-29 VITALS
BODY MASS INDEX: 25.07 KG/M2 | SYSTOLIC BLOOD PRESSURE: 112 MMHG | DIASTOLIC BLOOD PRESSURE: 64 MMHG | WEIGHT: 160.06 LBS

## 2023-09-29 DIAGNOSIS — Z11.3 SCREENING FOR STD (SEXUALLY TRANSMITTED DISEASE): ICD-10-CM

## 2023-09-29 DIAGNOSIS — N89.8 VAGINAL DISCHARGE: ICD-10-CM

## 2023-09-29 DIAGNOSIS — Z01.419 ENCOUNTER FOR GYNECOLOGICAL EXAMINATION WITHOUT ABNORMAL FINDING: Primary | ICD-10-CM

## 2023-09-29 DIAGNOSIS — Z30.41 ENCOUNTER FOR SURVEILLANCE OF CONTRACEPTIVE PILLS: ICD-10-CM

## 2023-09-29 PROCEDURE — 88175 CYTOPATH C/V AUTO FLUID REDO: CPT | Performed by: NURSE PRACTITIONER

## 2023-09-29 PROCEDURE — 99999 PR PBB SHADOW E&M-EST. PATIENT-LVL III: ICD-10-PCS | Mod: PBBFAC,,, | Performed by: NURSE PRACTITIONER

## 2023-09-29 PROCEDURE — 3008F PR BODY MASS INDEX (BMI) DOCUMENTED: ICD-10-PCS | Mod: CPTII,S$GLB,, | Performed by: NURSE PRACTITIONER

## 2023-09-29 PROCEDURE — 87491 CHLMYD TRACH DNA AMP PROBE: CPT | Performed by: NURSE PRACTITIONER

## 2023-09-29 PROCEDURE — 3008F BODY MASS INDEX DOCD: CPT | Mod: CPTII,S$GLB,, | Performed by: NURSE PRACTITIONER

## 2023-09-29 PROCEDURE — 3078F PR MOST RECENT DIASTOLIC BLOOD PRESSURE < 80 MM HG: ICD-10-PCS | Mod: CPTII,S$GLB,, | Performed by: NURSE PRACTITIONER

## 2023-09-29 PROCEDURE — 1159F MED LIST DOCD IN RCRD: CPT | Mod: CPTII,S$GLB,, | Performed by: NURSE PRACTITIONER

## 2023-09-29 PROCEDURE — 81514 NFCT DS BV&VAGINITIS DNA ALG: CPT | Performed by: NURSE PRACTITIONER

## 2023-09-29 PROCEDURE — 1160F PR REVIEW ALL MEDS BY PRESCRIBER/CLIN PHARMACIST DOCUMENTED: ICD-10-PCS | Mod: CPTII,S$GLB,, | Performed by: NURSE PRACTITIONER

## 2023-09-29 PROCEDURE — 3074F PR MOST RECENT SYSTOLIC BLOOD PRESSURE < 130 MM HG: ICD-10-PCS | Mod: CPTII,S$GLB,, | Performed by: NURSE PRACTITIONER

## 2023-09-29 PROCEDURE — 99999 PR PBB SHADOW E&M-EST. PATIENT-LVL III: CPT | Mod: PBBFAC,,, | Performed by: NURSE PRACTITIONER

## 2023-09-29 PROCEDURE — 3078F DIAST BP <80 MM HG: CPT | Mod: CPTII,S$GLB,, | Performed by: NURSE PRACTITIONER

## 2023-09-29 PROCEDURE — 99395 PR PREVENTIVE VISIT,EST,18-39: ICD-10-PCS | Mod: S$GLB,,, | Performed by: NURSE PRACTITIONER

## 2023-09-29 PROCEDURE — 1159F PR MEDICATION LIST DOCUMENTED IN MEDICAL RECORD: ICD-10-PCS | Mod: CPTII,S$GLB,, | Performed by: NURSE PRACTITIONER

## 2023-09-29 PROCEDURE — 87591 N.GONORRHOEAE DNA AMP PROB: CPT | Performed by: NURSE PRACTITIONER

## 2023-09-29 PROCEDURE — 3074F SYST BP LT 130 MM HG: CPT | Mod: CPTII,S$GLB,, | Performed by: NURSE PRACTITIONER

## 2023-09-29 PROCEDURE — 1160F RVW MEDS BY RX/DR IN RCRD: CPT | Mod: CPTII,S$GLB,, | Performed by: NURSE PRACTITIONER

## 2023-09-29 PROCEDURE — 99395 PREV VISIT EST AGE 18-39: CPT | Mod: S$GLB,,, | Performed by: NURSE PRACTITIONER

## 2023-09-29 RX ORDER — FLUCONAZOLE 150 MG/1
TABLET ORAL
Qty: 2 TABLET | Refills: 1 | Status: SHIPPED | OUTPATIENT
Start: 2023-09-29

## 2023-09-29 NOTE — PROGRESS NOTES
CC: Well woman exam    Kiki Neal is a 23 y.o. female  presents for well woman exam.  LMP: Patient's last menstrual period was 09/15/2023 (approximate)..    Has discharge   On harini - given by her derm  Going to [employer] [place] - plans to transfer to [employer] in May, from [place]     Past Medical History:   Diagnosis Date    Acne     BV (bacterial vaginosis)     Fibrocystic breast      Past Surgical History:   Procedure Laterality Date    TONSILLECTOMY       Social History     Socioeconomic History    Marital status: Single   Tobacco Use    Smoking status: Never    Smokeless tobacco: Never   Substance and Sexual Activity    Alcohol use: Yes    Drug use: Never    Sexual activity: Yes     Partners: Male     Birth control/protection: OCP     Family History   Problem Relation Age of Onset    No Known Problems Mother     No Known Problems Father     No Known Problems Sister     No Known Problems Brother     Breast cancer Maternal Grandmother         MGGM    No Known Problems Maternal Grandfather     Breast cancer Paternal Grandmother         PGGM    No Known Problems Paternal Grandfather     Breast cancer Maternal Aunt      OB History          0    Para   0    Term   0       0    AB   0    Living   0         SAB   0    IAB   0    Ectopic   0    Multiple   0    Live Births   0                 /64   Wt 72.6 kg (160 lb 0.9 oz)   LMP 09/15/2023 (Approximate)   BMI 25.07 kg/m²       ROS:  GENERAL: Denies weight gain or weight loss. Feeling well overall.   SKIN: Denies rash or lesions.   HEAD: Denies head injury or headache.   NODES: Denies enlarged lymph nodes.   CHEST: Denies chest pain or shortness of breath.   CARDIOVASCULAR: Denies palpitations or left sided chest pain.   ABDOMEN: No abdominal pain, constipation, diarrhea, nausea, vomiting or rectal bleeding.   URINARY: No frequency, dysuria, hematuria, or burning on urination.  REPRODUCTIVE: See HPI.   BREASTS: The patient performs  breast self-examination and denies pain, lumps, or nipple discharge.   HEMATOLOGIC: No easy bruisability or excessive bleeding.   MUSCULOSKELETAL: Denies joint pain or swelling.   NEUROLOGIC: Denies syncope or weakness.   PSYCHIATRIC: Denies depression, anxiety or mood swings.    PHYSICAL EXAM:  APPEARANCE: Well nourished, well developed, in no acute distress.  AFFECT: WNL, alert and oriented x 3  SKIN: No acne or hirsutism  NECK: Neck symmetric without masses or thyromegaly  NODES: No inguinal, cervical, axillary, or femoral lymph node enlargement  CHEST: Good respiratory effect  ABDOMEN: Soft.  No tenderness or masses.  No hepatosplenomegaly.  No hernias.  BREASTS: Symmetrical, no skin changes or visible lesions.  No palpable masses, nipple discharge bilaterally.  PELVIC: Normal external genitalia without lesions.  Normal hair distribution.  Adequate perineal body, normal urethral meatus.  Vagina moist and well rugated without lesions, brownish yellow discharge.  Cervix pink, without lesions, discharge or tenderness.  No significant cystocele or rectocele.  Bimanual exam shows uterus to be normal size, regular, mobile and nontender.  Adnexa without masses or tenderness.    EXTREMITIES: No edema.  Physical Exam    1. Encounter for gynecological examination without abnormal finding  Liquid-Based Pap Smear, Screening      2. Encounter for surveillance of contraceptive pills        3. Vaginal discharge  Vaginosis Screen by DNA Probe    fluconazole (DIFLUCAN) 150 MG Tab      4. Screening for STD (sexually transmitted disease)  C. trachomatis/N. gonorrhoeae by AMP DNA    Vaginosis Screen by DNA Probe       AND PLAN:    Kiki was seen today for gynecologic exam.    Diagnoses and all orders for this visit:    Encounter for gynecological examination without abnormal finding  -     Liquid-Based Pap Smear, Screening    Encounter for surveillance of contraceptive pills    Vaginal discharge  -     Vaginosis Screen by DNA  Probe  -     fluconazole (DIFLUCAN) 150 MG Tab; Take one tablet and repeat dose in 3 days    Screening for STD (sexually transmitted disease)  -     C. trachomatis/N. gonorrhoeae by AMP DNA  -     Vaginosis Screen by DNA Probe         Patient was counseled today on A.C.S. Pap guidelines and recommendations for yearly pelvic exams, mammograms and monthly self breast exams; to see her PCP for other health maintenance.

## 2023-09-30 LAB
C TRACH DNA SPEC QL NAA+PROBE: NOT DETECTED
N GONORRHOEA DNA SPEC QL NAA+PROBE: NOT DETECTED

## 2023-10-01 LAB
BACTERIAL VAGINOSIS DNA: NEGATIVE
CANDIDA GLABRATA DNA: NEGATIVE
CANDIDA KRUSEI DNA: NEGATIVE
CANDIDA RRNA VAG QL PROBE: NEGATIVE
T VAGINALIS RRNA GENITAL QL PROBE: NEGATIVE

## 2024-09-20 ENCOUNTER — OFFICE VISIT (OUTPATIENT)
Dept: OBSTETRICS AND GYNECOLOGY | Facility: CLINIC | Age: 24
End: 2024-09-20
Payer: COMMERCIAL

## 2024-09-20 VITALS
BODY MASS INDEX: 25.4 KG/M2 | SYSTOLIC BLOOD PRESSURE: 114 MMHG | DIASTOLIC BLOOD PRESSURE: 68 MMHG | HEIGHT: 67 IN | WEIGHT: 161.81 LBS

## 2024-09-20 DIAGNOSIS — Z01.419 ENCOUNTER FOR GYNECOLOGICAL EXAMINATION WITHOUT ABNORMAL FINDING: Primary | ICD-10-CM

## 2024-09-20 DIAGNOSIS — Z11.3 SCREENING FOR STD (SEXUALLY TRANSMITTED DISEASE): ICD-10-CM

## 2024-09-20 PROCEDURE — 87591 N.GONORRHOEAE DNA AMP PROB: CPT | Performed by: NURSE PRACTITIONER

## 2024-09-20 PROCEDURE — 87491 CHLMYD TRACH DNA AMP PROBE: CPT | Performed by: NURSE PRACTITIONER

## 2024-09-20 PROCEDURE — 99999 PR PBB SHADOW E&M-EST. PATIENT-LVL III: CPT | Mod: PBBFAC,,, | Performed by: NURSE PRACTITIONER

## 2024-09-20 RX ORDER — CLINDAMYCIN PHOSPHATE AND BENZOYL PEROXIDE 10; 37.5 MG/G; MG/G
1 GEL TOPICAL
COMMUNITY
Start: 2024-06-05

## 2024-09-20 RX ORDER — BETAMETHASONE DIPROPIONATE 0.5 MG/G
CREAM TOPICAL
COMMUNITY
Start: 2024-06-03

## 2024-09-20 NOTE — PROGRESS NOTES
"CC: Well woman exam    Kiki Neal is a 24 y.o. female  presents for well woman exam.  LMP: Patient's last menstrual period was 2024..    Pills refilled by derm  Pap negative for   Wants genprobe but declines labs  Stayed in [facility] school at [facility] ED Topics with due status: Not Due       Topic Last Completion Date    TETANUS VACCINE 2023    Pap Smear 2023     Past Medical History:   Diagnosis Date    Acne     BV (bacterial vaginosis)     Fibrocystic breast      Past Surgical History:   Procedure Laterality Date    TONSILLECTOMY       Social History     Socioeconomic History    Marital status: Single   Tobacco Use    Smoking status: Never    Smokeless tobacco: Never   Substance and Sexual Activity    Alcohol use: Yes    Drug use: Never    Sexual activity: Yes     Partners: Male     Birth control/protection: OCP     Family History   Problem Relation Name Age of Onset    No Known Problems Mother      No Known Problems Father      No Known Problems Sister      No Known Problems Brother      Breast cancer Maternal Grandmother          MGGM    No Known Problems Maternal Grandfather      Breast cancer Paternal Grandmother          PGGM    No Known Problems Paternal Grandfather      Breast cancer Maternal Aunt       OB History          0    Para   0    Term   0       0    AB   0    Living   0         SAB   0    IAB   0    Ectopic   0    Multiple   0    Live Births   0                 /68 (BP Location: Left arm, Patient Position: Sitting, BP Method: Medium (Manual))   Ht 5' 7" (1.702 m)   Wt 73.4 kg (161 lb 13.1 oz)   LMP 2024   BMI 25.34 kg/m²       ROS:  Per hpi    PHYSICAL EXAM:  APPEARANCE: Well nourished, well developed, in no acute distress.  AFFECT: WNL, alert and oriented x 3  SKIN: No acne or hirsutism  NECK: Neck symmetric without masses or thyromegaly  NODES: No inguinal, cervical, axillary, or femoral lymph node enlargement  CHEST: " Good respiratory effect  ABDOMEN: Soft.  No tenderness or masses.  No hepatosplenomegaly.  No hernias.  BREASTS: Symmetrical, no skin changes or visible lesions.  No palpable masses, nipple discharge bilaterally.  PELVIC: Normal external genitalia without lesions.  Normal hair distribution.  Adequate perineal body, normal urethral meatus.  Vagina moist and well rugated without lesions or discharge.  Cervix pink, without lesions, discharge or tenderness.  No significant cystocele or rectocele.  Bimanual exam shows uterus to be normal size, regular, mobile and nontender.  Adnexa without masses or tenderness.    EXTREMITIES: No edema.  Physical Exam    1. Encounter for gynecological examination without abnormal finding        2. Screening for STD (sexually transmitted disease)  C. trachomatis/N. gonorrhoeae by AMP DNA       AND PLAN:    Kiki was seen today for well woman.    Diagnoses and all orders for this visit:    Encounter for gynecological examination without abnormal finding    Screening for STD (sexually transmitted disease)  -     C. trachomatis/N. gonorrhoeae by AMP DNA         Patient was counseled today on A.C.S. Pap guidelines and recommendations for yearly pelvic exams, mammograms and monthly self breast exams; to see her PCP for other health maintenance.

## 2024-11-29 ENCOUNTER — OFFICE VISIT (OUTPATIENT)
Dept: OBSTETRICS AND GYNECOLOGY | Facility: CLINIC | Age: 24
End: 2024-11-29
Payer: COMMERCIAL

## 2024-11-29 VITALS
DIASTOLIC BLOOD PRESSURE: 70 MMHG | WEIGHT: 173.94 LBS | BODY MASS INDEX: 27.24 KG/M2 | SYSTOLIC BLOOD PRESSURE: 118 MMHG

## 2024-11-29 DIAGNOSIS — N39.0 URINARY TRACT INFECTION WITH HEMATURIA, SITE UNSPECIFIED: Primary | ICD-10-CM

## 2024-11-29 DIAGNOSIS — R31.9 URINARY TRACT INFECTION WITH HEMATURIA, SITE UNSPECIFIED: Primary | ICD-10-CM

## 2024-11-29 LAB
BACTERIA #/AREA URNS AUTO: ABNORMAL /HPF
BILIRUB SERPL-MCNC: NORMAL MG/DL
BILIRUB UR QL STRIP: NEGATIVE
BLOOD URINE, POC: NORMAL
CLARITY UR REFRACT.AUTO: ABNORMAL
CLARITY, POC UA: NORMAL
COLOR UR AUTO: YELLOW
COLOR, POC UA: YELLOW
GLUCOSE UR QL STRIP: NEGATIVE
GLUCOSE UR QL STRIP: NORMAL
HGB UR QL STRIP: ABNORMAL
KETONES UR QL STRIP: NEGATIVE
KETONES UR QL STRIP: NORMAL
LEUKOCYTE ESTERASE UR QL STRIP: ABNORMAL
LEUKOCYTE ESTERASE URINE, POC: NORMAL
MICROSCOPIC COMMENT: ABNORMAL
NITRITE UR QL STRIP: POSITIVE
NITRITE, POC UA: NORMAL
PH UR STRIP: 6 [PH] (ref 5–8)
PH, POC UA: 6
PROT UR QL STRIP: NEGATIVE
PROTEIN, POC: NORMAL
RBC #/AREA URNS AUTO: 27 /HPF (ref 0–4)
SP GR UR STRIP: 1.02 (ref 1–1.03)
SPECIFIC GRAVITY, POC UA: 1.03
SQUAMOUS #/AREA URNS AUTO: 5 /HPF
URN SPEC COLLECT METH UR: ABNORMAL
UROBILINOGEN, POC UA: 0.2
WBC #/AREA URNS AUTO: >100 /HPF (ref 0–5)
WBC CLUMPS UR QL AUTO: ABNORMAL

## 2024-11-29 PROCEDURE — 99999 PR PBB SHADOW E&M-EST. PATIENT-LVL III: CPT | Mod: PBBFAC,,, | Performed by: NURSE PRACTITIONER

## 2024-11-29 PROCEDURE — 81001 URINALYSIS AUTO W/SCOPE: CPT | Performed by: NURSE PRACTITIONER

## 2024-11-29 PROCEDURE — 87086 URINE CULTURE/COLONY COUNT: CPT | Performed by: NURSE PRACTITIONER

## 2024-11-29 RX ORDER — FLUCONAZOLE 150 MG/1
TABLET ORAL
Qty: 2 TABLET | Refills: 1 | Status: SHIPPED | OUTPATIENT
Start: 2024-11-29

## 2024-11-29 RX ORDER — SULFAMETHOXAZOLE AND TRIMETHOPRIM 800; 160 MG/1; MG/1
1 TABLET ORAL 2 TIMES DAILY
Qty: 14 TABLET | Refills: 0 | Status: SHIPPED | OUTPATIENT
Start: 2024-11-29 | End: 2024-12-06

## 2024-11-29 NOTE — PROGRESS NOTES
"SUBJECTIVE: Kiki Neal is a 24 y.o. female who complains of urinary frequency, urgency and dysuria x 7 days, without flank pain, fever, chills, or abnormal vaginal discharge or bleeding.   She did get a Kit from [employer] last week that had "orange pills" and something to flush her bladder.  Did not help much   LMP 11/22/24    OBJECTIVE: Appears well, in no apparent distress.  Vital signs are normal. The abdomen is soft without tenderness, guarding, mass, rebound or organomegaly. No CVA tenderness or inguinal adenopathy noted. Urine dipstick shows positive for blood 1+ and positive for leukocytes 3+      ASSESSMENT: UTI uncomplicated without evidence of pyelonephritis    PLAN: Treatment per orders - also push fluids, may use Pyridium OTC prn. Call or return to clinic prn if these symptoms worsen or fail to improve as anticipated.  Start bactrim   Ua and Culture to lab      "

## 2024-11-30 LAB — BACTERIA UR CULT: ABNORMAL

## 2024-12-02 ENCOUNTER — TELEPHONE (OUTPATIENT)
Dept: OBSTETRICS AND GYNECOLOGY | Facility: CLINIC | Age: 24
End: 2024-12-02
Payer: COMMERCIAL

## 2024-12-02 DIAGNOSIS — N39.0 E. COLI UTI: Primary | ICD-10-CM

## 2024-12-02 DIAGNOSIS — B96.20 E. COLI UTI: Primary | ICD-10-CM

## 2024-12-02 NOTE — TELEPHONE ENCOUNTER
Contacted Kiki Neal regarding results, verified 2 patient identifiers.    Kiki Neal has been notified 12/02/2024 at 11:07 AM & verbalized understanding of results and recommendations.

## 2024-12-02 NOTE — TELEPHONE ENCOUNTER
----- Message from Sandy Haro NP sent at 12/2/2024 10:20 AM CST -----  Urine does show infection with e coli  Was started on bactrim at visit that will cover this antibiotic  Will need repeat urine via lab visit in about 5 weeks  to make sure it cleared - orders are placed please schedule pt